# Patient Record
Sex: FEMALE | Race: WHITE | Employment: STUDENT | URBAN - METROPOLITAN AREA
[De-identification: names, ages, dates, MRNs, and addresses within clinical notes are randomized per-mention and may not be internally consistent; named-entity substitution may affect disease eponyms.]

---
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
## 2019-04-05 LAB
ALBUMIN SERPL-MCNC: NORMAL G/DL
ALP BLD-CCNC: NORMAL U/L
ALT SERPL-CCNC: NORMAL U/L
ANION GAP SERPL CALCULATED.3IONS-SCNC: NORMAL MMOL/L
AST SERPL-CCNC: NORMAL U/L
BILIRUB SERPL-MCNC: NORMAL MG/DL (ref 0.1–1.4)
BUN BLDV-MCNC: NORMAL MG/DL
CALCIUM SERPL-MCNC: NORMAL MG/DL
CHLORIDE BLD-SCNC: NORMAL MMOL/L
CO2: NORMAL MMOL/L
CREAT SERPL-MCNC: NORMAL MG/DL
GFR CALCULATED: NORMAL
GLUCOSE BLD-MCNC: NORMAL MG/DL
POTASSIUM SERPL-SCNC: NORMAL MMOL/L
SODIUM BLD-SCNC: NORMAL MMOL/L
TOTAL PROTEIN: NORMAL

## 2019-05-22 ENCOUNTER — OFFICE VISIT (OUTPATIENT)
Dept: FAMILY MEDICINE CLINIC | Age: 24
End: 2019-05-22
Payer: COMMERCIAL

## 2019-05-22 VITALS
OXYGEN SATURATION: 98 % | SYSTOLIC BLOOD PRESSURE: 120 MMHG | DIASTOLIC BLOOD PRESSURE: 62 MMHG | HEART RATE: 101 BPM | TEMPERATURE: 99.1 F | WEIGHT: 177 LBS

## 2019-05-22 DIAGNOSIS — H66.002 ACUTE SUPPURATIVE OTITIS MEDIA OF LEFT EAR WITHOUT SPONTANEOUS RUPTURE OF TYMPANIC MEMBRANE, RECURRENCE NOT SPECIFIED: ICD-10-CM

## 2019-05-22 DIAGNOSIS — J32.9 SINUSITIS, UNSPECIFIED CHRONICITY, UNSPECIFIED LOCATION: Primary | ICD-10-CM

## 2019-05-22 PROCEDURE — 99214 OFFICE O/P EST MOD 30 MIN: CPT | Performed by: FAMILY MEDICINE

## 2019-05-22 RX ORDER — AZITHROMYCIN 250 MG/1
TABLET, FILM COATED ORAL
Qty: 6 TABLET | Refills: 0 | Status: SHIPPED | OUTPATIENT
Start: 2019-05-22 | End: 2019-09-04 | Stop reason: ALTCHOICE

## 2019-05-22 RX ORDER — NORGESTIMATE AND ETHINYL ESTRADIOL 0.25-0.035
KIT ORAL
COMMUNITY

## 2019-05-22 RX ORDER — ESCITALOPRAM OXALATE 5 MG/1
TABLET ORAL
Refills: 1 | COMMUNITY
Start: 2019-05-15 | End: 2019-05-24 | Stop reason: SDUPTHER

## 2019-05-22 NOTE — PROGRESS NOTES
19  Mare Ham : 1995 Sex: female  Age: 22 y.o. Chief Complaint   Patient presents with    Congestion    Pharyngitis    Cough     lots of drainage        HPI  HPI:    Patient percent today with sinus pressure thick rhinorrhea postnasal drainage left earache no headache dizziness fever or chills. Mild sore throat without swelling or difficulty swallowing. No trismus. Dry cough nonproductive no shortness of breath wheezing or bowel symptoms. She denies any chance of being or getting pregnant and not breast-feeding. ROS:  As above      Current Outpatient Medications:     azithromycin (ZITHROMAX) 250 MG tablet, 2 po qd  day 1 then 1 po qd  days 2 - 5, Disp: 6 tablet, Rfl: 0    norgestimate-ethinyl estradiol (ORTHO-CYCLEN) 0.25-35 MG-MCG per tablet, Take by mouth, Disp: , Rfl:     escitalopram (LEXAPRO) 5 MG tablet, TAKE ONE TABLET BY MOUTH WITH EVENING MEAL, Disp: , Rfl: 1  Allergies   Allergen Reactions    Ceclor [Cefaclor] Swelling       No past medical history on file. History reviewed. No pertinent surgical history. History reviewed. No pertinent family history. Social History     Tobacco Use    Smoking status: Never Smoker    Smokeless tobacco: Never Used   Substance Use Topics    Alcohol use: Not on file    Drug use: Not on file      Social History     Social History Narrative    Not on file        Vitals:    19 1626   BP: 120/62   Pulse: 101   Temp: 99.1 °F (37.3 °C)   TempSrc: Tympanic   SpO2: 98%   Weight: 177 lb (80.3 kg)     Wt Readings from Last 3 Encounters:   19 177 lb (80.3 kg)        Physical Exam    Exam:  Const: Appears comfortable. No signs of acute distress present. Head/Face: Atraumatic, normocephalic on inspection. Eyes: No discharge from the eyes. Sclerae clear. ENMT:   Ears fluid B/L, left tm erythema, nose boggy, oral pharynx thick postnasal drainage no exudate swelling or asymmetry. Neck: Supple. Palpation reveals no adenopathy.  No masses advised to check with insurance company to ensure coverage and to fully understand benefits and cost prior to any testing. This note was created with the assistance of voice recognition software. Document was reviewed however may contain grammatical errors.

## 2019-05-24 ENCOUNTER — OFFICE VISIT (OUTPATIENT)
Dept: PRIMARY CARE CLINIC | Age: 24
End: 2019-05-24
Payer: COMMERCIAL

## 2019-05-24 VITALS
HEIGHT: 67 IN | SYSTOLIC BLOOD PRESSURE: 110 MMHG | TEMPERATURE: 98.3 F | DIASTOLIC BLOOD PRESSURE: 80 MMHG | BODY MASS INDEX: 27.47 KG/M2 | WEIGHT: 175 LBS

## 2019-05-24 DIAGNOSIS — F34.1 DYSTHYMIA: Primary | ICD-10-CM

## 2019-05-24 DIAGNOSIS — R53.83 FATIGUE, UNSPECIFIED TYPE: ICD-10-CM

## 2019-05-24 DIAGNOSIS — J01.10 ACUTE FRONTAL SINUSITIS, RECURRENCE NOT SPECIFIED: ICD-10-CM

## 2019-05-24 PROCEDURE — 99214 OFFICE O/P EST MOD 30 MIN: CPT | Performed by: FAMILY MEDICINE

## 2019-05-24 RX ORDER — ESCITALOPRAM OXALATE 5 MG/1
TABLET ORAL
Qty: 30 TABLET | Refills: 2 | Status: SHIPPED | OUTPATIENT
Start: 2019-05-24 | End: 2019-09-04 | Stop reason: SDUPTHER

## 2019-05-24 RX ORDER — PREDNISONE 1 MG/1
TABLET ORAL
Qty: 30 TABLET | Refills: 0 | Status: SHIPPED | OUTPATIENT
Start: 2019-05-24 | End: 2019-09-04 | Stop reason: ALTCHOICE

## 2019-05-24 ASSESSMENT — ENCOUNTER SYMPTOMS
RESPIRATORY NEGATIVE: 1
GASTROINTESTINAL NEGATIVE: 1
ALLERGIC/IMMUNOLOGIC NEGATIVE: 1
EYES NEGATIVE: 1

## 2019-05-24 NOTE — PROGRESS NOTES
specified            No follow-ups on file. Nicole Gaston, DO    Note was generated with the assistance of voice recognition software. Document was reviewed however may contain grammatical errors.

## 2019-09-04 ENCOUNTER — OFFICE VISIT (OUTPATIENT)
Dept: PRIMARY CARE CLINIC | Age: 24
End: 2019-09-04
Payer: COMMERCIAL

## 2019-09-04 VITALS
OXYGEN SATURATION: 96 % | SYSTOLIC BLOOD PRESSURE: 120 MMHG | WEIGHT: 176 LBS | DIASTOLIC BLOOD PRESSURE: 70 MMHG | TEMPERATURE: 98.3 F | BODY MASS INDEX: 27.98 KG/M2 | HEART RATE: 104 BPM

## 2019-09-04 DIAGNOSIS — J01.00 ACUTE NON-RECURRENT MAXILLARY SINUSITIS: Primary | ICD-10-CM

## 2019-09-04 DIAGNOSIS — F41.9 ANXIETY: ICD-10-CM

## 2019-09-04 PROCEDURE — 99214 OFFICE O/P EST MOD 30 MIN: CPT | Performed by: FAMILY MEDICINE

## 2019-09-04 RX ORDER — AZITHROMYCIN 250 MG/1
TABLET, FILM COATED ORAL
Qty: 1 PACKET | Refills: 0 | Status: SHIPPED
Start: 2019-09-04 | End: 2020-02-12 | Stop reason: ALTCHOICE

## 2019-09-04 RX ORDER — ESCITALOPRAM OXALATE 5 MG/1
TABLET ORAL
Qty: 90 TABLET | Refills: 1 | Status: SHIPPED
Start: 2019-09-04 | End: 2020-02-12 | Stop reason: SDUPTHER

## 2019-09-04 RX ORDER — PREDNISONE 10 MG/1
10 TABLET ORAL 2 TIMES DAILY
Qty: 10 TABLET | Refills: 0 | Status: SHIPPED | OUTPATIENT
Start: 2019-09-04 | End: 2019-09-09

## 2019-09-04 ASSESSMENT — ENCOUNTER SYMPTOMS
EYES NEGATIVE: 1
SINUS PAIN: 1
GASTROINTESTINAL NEGATIVE: 1
SORE THROAT: 1
RESPIRATORY NEGATIVE: 1
ALLERGIC/IMMUNOLOGIC NEGATIVE: 1

## 2019-09-04 NOTE — PROGRESS NOTES
19     Ward Yang    : 1995 Sex: female   Age: 22 y.o. Chief Complaint   Patient presents with    Anxiety    Sinus Problem     x 1 week. Using mucinex & nyquil       Prior to Admission medications    Medication Sig Start Date End Date Taking? Authorizing Provider   escitalopram (LEXAPRO) 5 MG tablet TAKE ONE TABLET BY MOUTH WITH EVENING MEAL 19  Yes Aydee Cespedes, DO   predniSONE (DELTASONE) 10 MG tablet Take 1 tablet by mouth 2 times daily for 5 days 19 Yes Aydee Cespedes, DO   azithromycin (ZITHROMAX Z-BLACK) 250 MG tablet 2 today  Then 1 qd  4 days 19  Yes Ed Fischer, DO   norgestimate-ethinyl estradiol (ORTHO-CYCLEN) 0.25-35 MG-MCG per tablet Take by mouth   Yes Historical Provider, MD          HPI: Patient is seen today medical follow-up on cough congestion sinus complaints. Onset over the past week. Denies significant fever chills. Tenderness frontal maxillary sinuses. Yellow mucus drainage. Anxiety currently stable. Maintain on Lexapro as prescribed. Review of Systems   Constitutional: Negative. HENT: Positive for congestion, sinus pain and sore throat. Eyes: Negative. Respiratory: Negative. Gastrointestinal: Negative. Endocrine: Negative. Genitourinary: Negative. Musculoskeletal: Negative. Skin: Negative. Allergic/Immunologic: Negative. Neurological: Negative. Hematological: Negative. Psychiatric/Behavioral: Negative.                Current Outpatient Medications:     escitalopram (LEXAPRO) 5 MG tablet, TAKE ONE TABLET BY MOUTH WITH EVENING MEAL, Disp: 90 tablet, Rfl: 1    predniSONE (DELTASONE) 10 MG tablet, Take 1 tablet by mouth 2 times daily for 5 days, Disp: 10 tablet, Rfl: 0    azithromycin (ZITHROMAX Z-BLACK) 250 MG tablet, 2 today  Then 1 qd  4 days, Disp: 1 packet, Rfl: 0    norgestimate-ethinyl estradiol (ORTHO-CYCLEN) 0.25-35 MG-MCG per tablet, Take by mouth, Disp: , Rfl:     Allergies

## 2019-11-05 SDOH — HEALTH STABILITY: MENTAL HEALTH: HOW OFTEN DO YOU HAVE A DRINK CONTAINING ALCOHOL?: NEVER

## 2020-02-05 ENCOUNTER — TELEPHONE (OUTPATIENT)
Dept: PEDIATRICS CLINIC | Age: 25
End: 2020-02-05

## 2020-02-05 NOTE — TELEPHONE ENCOUNTER
Mom tested positive for influenza at her doctors. Asking you could send over prophylactic tamiflu since she was exposed. Dr. Chino Carty is going to send over tamiflu for the younger siblings.

## 2020-02-07 RX ORDER — OSELTAMIVIR PHOSPHATE 75 MG/1
75 CAPSULE ORAL 2 TIMES DAILY
COMMUNITY
End: 2020-09-21

## 2020-02-12 ENCOUNTER — HOSPITAL ENCOUNTER (OUTPATIENT)
Age: 25
Discharge: HOME | End: 2020-02-14
Payer: COMMERCIAL

## 2020-02-12 ENCOUNTER — OFFICE VISIT (OUTPATIENT)
Dept: PRIMARY CARE CLINIC | Age: 25
End: 2020-02-12
Payer: COMMERCIAL

## 2020-02-12 VITALS
HEART RATE: 102 BPM | RESPIRATION RATE: 16 BRPM | SYSTOLIC BLOOD PRESSURE: 122 MMHG | TEMPERATURE: 98.4 F | BODY MASS INDEX: 28.3 KG/M2 | WEIGHT: 178 LBS | DIASTOLIC BLOOD PRESSURE: 74 MMHG | OXYGEN SATURATION: 98 %

## 2020-02-12 PROBLEM — R53.83 FATIGUE: Status: ACTIVE | Noted: 2020-02-12

## 2020-02-12 LAB
ALBUMIN SERPL-MCNC: 4.3 G/DL (ref 3.5–5.2)
ALP BLD-CCNC: 75 U/L (ref 35–104)
ALT SERPL-CCNC: 33 U/L (ref 0–32)
ANION GAP SERPL CALCULATED.3IONS-SCNC: 12 MMOL/L (ref 7–16)
AST SERPL-CCNC: 25 U/L (ref 0–31)
BASOPHILS ABSOLUTE: 0.08 E9/L (ref 0–0.2)
BASOPHILS RELATIVE PERCENT: 0.8 % (ref 0–2)
BILIRUB SERPL-MCNC: 0.4 MG/DL (ref 0–1.2)
BUN BLDV-MCNC: 11 MG/DL (ref 6–20)
CALCIUM SERPL-MCNC: 9.4 MG/DL (ref 8.6–10.2)
CHLORIDE BLD-SCNC: 101 MMOL/L (ref 98–107)
CO2: 24 MMOL/L (ref 22–29)
CREAT SERPL-MCNC: 0.8 MG/DL (ref 0.5–1)
EOSINOPHILS ABSOLUTE: 0.02 E9/L (ref 0.05–0.5)
EOSINOPHILS RELATIVE PERCENT: 0.2 % (ref 0–6)
GFR AFRICAN AMERICAN: >60
GFR NON-AFRICAN AMERICAN: >60 ML/MIN/1.73
GLUCOSE BLD-MCNC: 110 MG/DL (ref 74–99)
HCT VFR BLD CALC: 42.2 % (ref 34–48)
HEMOGLOBIN: 13.5 G/DL (ref 11.5–15.5)
IMMATURE GRANULOCYTES #: 0.04 E9/L
IMMATURE GRANULOCYTES %: 0.4 % (ref 0–5)
LYMPHOCYTES ABSOLUTE: 3.2 E9/L (ref 1.5–4)
LYMPHOCYTES RELATIVE PERCENT: 30.6 % (ref 20–42)
MCH RBC QN AUTO: 28.1 PG (ref 26–35)
MCHC RBC AUTO-ENTMCNC: 32 % (ref 32–34.5)
MCV RBC AUTO: 87.9 FL (ref 80–99.9)
MONOCYTES ABSOLUTE: 0.65 E9/L (ref 0.1–0.95)
MONOCYTES RELATIVE PERCENT: 6.2 % (ref 2–12)
NEUTROPHILS ABSOLUTE: 6.48 E9/L (ref 1.8–7.3)
NEUTROPHILS RELATIVE PERCENT: 61.8 % (ref 43–80)
PDW BLD-RTO: 12.1 FL (ref 11.5–15)
PLATELET # BLD: 304 E9/L (ref 130–450)
PMV BLD AUTO: 10.9 FL (ref 7–12)
POTASSIUM SERPL-SCNC: 4 MMOL/L (ref 3.5–5)
RBC # BLD: 4.8 E12/L (ref 3.5–5.5)
SODIUM BLD-SCNC: 137 MMOL/L (ref 132–146)
T4 TOTAL: 7.6 MCG/DL (ref 4.5–11.7)
TOTAL PROTEIN: 7.2 G/DL (ref 6.4–8.3)
TSH SERPL DL<=0.05 MIU/L-ACNC: 2.01 UIU/ML (ref 0.27–4.2)
WBC # BLD: 10.5 E9/L (ref 4.5–11.5)

## 2020-02-12 PROCEDURE — 80053 COMPREHEN METABOLIC PANEL: CPT

## 2020-02-12 PROCEDURE — 84443 ASSAY THYROID STIM HORMONE: CPT

## 2020-02-12 PROCEDURE — 84436 ASSAY OF TOTAL THYROXINE: CPT

## 2020-02-12 PROCEDURE — 85025 COMPLETE CBC W/AUTO DIFF WBC: CPT

## 2020-02-12 PROCEDURE — 99214 OFFICE O/P EST MOD 30 MIN: CPT | Performed by: FAMILY MEDICINE

## 2020-02-12 PROCEDURE — 36415 COLL VENOUS BLD VENIPUNCTURE: CPT

## 2020-02-12 RX ORDER — ESCITALOPRAM OXALATE 5 MG/1
TABLET ORAL
Qty: 90 TABLET | Refills: 3 | Status: SHIPPED
Start: 2020-02-12 | End: 2020-02-12

## 2020-02-12 RX ORDER — ESCITALOPRAM OXALATE 10 MG/1
10 TABLET ORAL DAILY
Qty: 90 TABLET | Refills: 1 | Status: SHIPPED | OUTPATIENT
Start: 2020-02-12

## 2020-02-12 ASSESSMENT — PATIENT HEALTH QUESTIONNAIRE - PHQ9
2. FEELING DOWN, DEPRESSED OR HOPELESS: 2
8. MOVING OR SPEAKING SO SLOWLY THAT OTHER PEOPLE COULD HAVE NOTICED. OR THE OPPOSITE, BEING SO FIGETY OR RESTLESS THAT YOU HAVE BEEN MOVING AROUND A LOT MORE THAN USUAL: 0
3. TROUBLE FALLING OR STAYING ASLEEP: 3
10. IF YOU CHECKED OFF ANY PROBLEMS, HOW DIFFICULT HAVE THESE PROBLEMS MADE IT FOR YOU TO DO YOUR WORK, TAKE CARE OF THINGS AT HOME, OR GET ALONG WITH OTHER PEOPLE: 2
SUM OF ALL RESPONSES TO PHQ QUESTIONS 1-9: 11
5. POOR APPETITE OR OVEREATING: 0
SUM OF ALL RESPONSES TO PHQ QUESTIONS 1-9: 11
7. TROUBLE CONCENTRATING ON THINGS, SUCH AS READING THE NEWSPAPER OR WATCHING TELEVISION: 1
4. FEELING TIRED OR HAVING LITTLE ENERGY: 2
9. THOUGHTS THAT YOU WOULD BE BETTER OFF DEAD, OR OF HURTING YOURSELF: 0
1. LITTLE INTEREST OR PLEASURE IN DOING THINGS: 2
6. FEELING BAD ABOUT YOURSELF - OR THAT YOU ARE A FAILURE OR HAVE LET YOURSELF OR YOUR FAMILY DOWN: 1
SUM OF ALL RESPONSES TO PHQ9 QUESTIONS 1 & 2: 4

## 2020-02-12 ASSESSMENT — ENCOUNTER SYMPTOMS
ALLERGIC/IMMUNOLOGIC NEGATIVE: 1
GASTROINTESTINAL NEGATIVE: 1
EYES NEGATIVE: 1
RESPIRATORY NEGATIVE: 1

## 2020-02-12 NOTE — PROGRESS NOTES
20     Clare Waller    : 1995 Sex: female   Age: 22 y.o. Chief Complaint   Patient presents with    Fatigue       Prior to Admission medications    Medication Sig Start Date End Date Taking? Authorizing Provider   escitalopram (LEXAPRO) 10 MG tablet Take 1 tablet by mouth daily 20  Yes Natanael Cespedes DO   oseltamivir (TAMIFLU) 75 MG capsule Take 75 mg by mouth 2 times daily 1 po twice daily   Yes Historical Provider, MD   norgestimate-ethinyl estradiol (ORTHO-CYCLEN) 0.25-35 MG-MCG per tablet Take by mouth   Yes Historical Provider, MD          HPI: Patient is seen today to follow-up on anxiety fatigue mild insomnia. Concerned that the current dose of Lexapro is not as effective is in the past.  We are going to adjust dosage to 10 mg daily. Reassessment next 4 to 6 weeks. Labs to be completed today. Review of Systems   Constitutional: Positive for fatigue. HENT: Negative. Eyes: Negative. Respiratory: Negative. Gastrointestinal: Negative. Endocrine: Negative. Genitourinary: Negative. Musculoskeletal: Negative. Skin: Negative. Allergic/Immunologic: Negative. Neurological: Negative. Hematological: Negative. Psychiatric/Behavioral: Negative. Systems review is overall stable as noted.         Current Outpatient Medications:     escitalopram (LEXAPRO) 10 MG tablet, Take 1 tablet by mouth daily, Disp: 90 tablet, Rfl: 1    oseltamivir (TAMIFLU) 75 MG capsule, Take 75 mg by mouth 2 times daily 1 po twice daily, Disp: , Rfl:     norgestimate-ethinyl estradiol (ORTHO-CYCLEN) 0.25-35 MG-MCG per tablet, Take by mouth, Disp: , Rfl:     Allergies   Allergen Reactions    Ceclor [Cefaclor] Swelling       Social History     Tobacco Use    Smoking status: Never Smoker    Smokeless tobacco: Never Used   Substance Use Topics    Alcohol use: Never     Frequency: Never    Drug use: Not on file      Past Surgical History:   Procedure Laterality Date    TONSILLECTOMY       No family history on file. No past medical history on file. Vitals:    02/12/20 1524   BP: 122/74   Pulse: 102   Resp: 16   Temp: 98.4 °F (36.9 °C)   TempSrc: Temporal   SpO2: 98%   Weight: 178 lb (80.7 kg)     BP Readings from Last 3 Encounters:   02/12/20 122/74   09/04/19 120/70   05/24/19 110/80        Physical Exam  Vitals signs and nursing note reviewed. Constitutional:       Appearance: She is well-developed. HENT:      Head: Normocephalic. Right Ear: External ear normal.      Left Ear: External ear normal.      Nose: Nose normal.   Eyes:      Conjunctiva/sclera: Conjunctivae normal.      Pupils: Pupils are equal, round, and reactive to light. Neck:      Musculoskeletal: Normal range of motion and neck supple. Cardiovascular:      Rate and Rhythm: Normal rate. Pulmonary:      Breath sounds: Normal breath sounds. Abdominal:      General: Bowel sounds are normal.      Palpations: Abdomen is soft. Musculoskeletal: Normal range of motion. Skin:     General: Skin is warm and dry. Neurological:      Mental Status: She is alert and oriented to person, place, and time. Psychiatric:         Behavior: Behavior normal.     Physical exam findings are all currently stable. I will maintain present meds and care. Adjustment with Lexapro to 10 mg and reassessment in 6 weeks. Plan Per Assessment:  Pam Carlson was seen today for fatigue. Diagnoses and all orders for this visit:    Anxiety    Fatigue, unspecified type  -     CBC Auto Differential; Future  -     Comprehensive Metabolic Panel; Future  -     T4; Future  -     TSH without Reflex; Future    Other orders  -     Discontinue: escitalopram (LEXAPRO) 5 MG tablet; TAKE ONE TABLET BY MOUTH WITH EVENING MEAL  -     escitalopram (LEXAPRO) 10 MG tablet; Take 1 tablet by mouth daily            Return in about 6 weeks (around 3/25/2020).       Nino Goodwin DO    Note was generated with the assistance of voice

## 2020-09-21 ENCOUNTER — VIRTUAL VISIT (OUTPATIENT)
Dept: PRIMARY CARE CLINIC | Age: 25
End: 2020-09-21
Payer: COMMERCIAL

## 2020-09-21 PROBLEM — R05.9 COUGH: Status: ACTIVE | Noted: 2020-09-21

## 2020-09-21 PROCEDURE — 99213 OFFICE O/P EST LOW 20 MIN: CPT | Performed by: FAMILY MEDICINE

## 2020-09-21 RX ORDER — AZITHROMYCIN 250 MG/1
TABLET, FILM COATED ORAL
Qty: 1 PACKET | Refills: 0 | Status: SHIPPED
Start: 2020-09-21 | End: 2020-11-09

## 2020-09-21 RX ORDER — PREDNISONE 1 MG/1
TABLET ORAL
Qty: 30 TABLET | Refills: 0 | Status: SHIPPED
Start: 2020-09-21 | End: 2020-11-09

## 2020-09-21 ASSESSMENT — ENCOUNTER SYMPTOMS
SINUS PRESSURE: 1
SINUS PAIN: 1
GASTROINTESTINAL NEGATIVE: 1
RESPIRATORY NEGATIVE: 1
EYES NEGATIVE: 1
ALLERGIC/IMMUNOLOGIC NEGATIVE: 1

## 2020-09-21 NOTE — PROGRESS NOTES
20     Monica Argueta    : 1995 Sex: female   Age: 26 y.o. Chief Complaint   Patient presents with    Sinus Problem     runny and stuffy nose, swollen glands. X 2-3 days    Congestion       Prior to Admission medications    Medication Sig Start Date End Date Taking? Authorizing Provider   escitalopram (LEXAPRO) 10 MG tablet Take 1 tablet by mouth daily 20  Yes Yaz Butt DO   norgestimate-ethinyl estradiol (ORTHO-CYCLEN) 0.25-35 MG-MCG per tablet Take by mouth   Yes Historical Provider, MD          HPI:           Review of Systems           Current Outpatient Medications:     escitalopram (LEXAPRO) 10 MG tablet, Take 1 tablet by mouth daily, Disp: 90 tablet, Rfl: 1    norgestimate-ethinyl estradiol (ORTHO-CYCLEN) 0.25-35 MG-MCG per tablet, Take by mouth, Disp: , Rfl:     Allergies   Allergen Reactions    Ceclor [Cefaclor] Swelling       Social History     Tobacco Use    Smoking status: Never Smoker    Smokeless tobacco: Never Used   Substance Use Topics    Alcohol use: Never     Frequency: Never    Drug use: Not on file      Past Surgical History:   Procedure Laterality Date    TONSILLECTOMY       No family history on file. No past medical history on file. There were no vitals filed for this visit. BP Readings from Last 3 Encounters:   20 122/74   19 120/70   19 110/80        Physical Exam             Plan Per Assessment:  There are no diagnoses linked to this encounter. No follow-ups on file. Yaz Butt DO    Note was generated with the assistance of voice recognition software. Document was reviewed however may contain grammatical errors.
MG-MCG per tablet, Take by mouth, Disp: , Rfl:     Allergies   Allergen Reactions    Ceclor [Cefaclor] Swelling       Social History     Tobacco Use    Smoking status: Never Smoker    Smokeless tobacco: Never Used   Substance Use Topics    Alcohol use: Never     Frequency: Never    Drug use: Not on file      Past Surgical History:   Procedure Laterality Date    TONSILLECTOMY       No family history on file. No past medical history on file. There were no vitals filed for this visit. BP Readings from Last 3 Encounters:   02/12/20 122/74   09/04/19 120/70   05/24/19 110/80        Physical ExamExpresses upper respiratory drainage cough congestion sinus drainage. Discussed treatment options and opted for Zithromax and prednisone Mucinex DM. We will follow-up if persistent symptoms. TeleMedicine Video Visit    This visit was performed as a virtual video visit using a synchronous, two-way, audio-video telehealth technology platform. Patient identification was verified at the start of the visit, including the patient's telephone number and physical location. I discussed with the patient the nature of our telehealth visits, that:     1. Due to the nature of an audio- video modality, the only components of a physical exam that could be done are the elements supported by direct observation. 2. I would evaluate the patient and recommend diagnostics and treatments based on my assessment. 3. If it was felt that the patient should be evaluated in clinic or an emergency room setting, then they would be directed there. 4. Our sessions are not being recorded and that personal health information is protected. 5. Our team would provide follow up care in person if/when the patient needs it. Patient does agree to proceed with telemedicine consultation.     Patient's location: home address in [facility]  Physician  location home address in [facility] other people involved in call      Time spent: Greater than Not billed by

## 2020-10-21 PROBLEM — R05.9 COUGH: Status: RESOLVED | Noted: 2020-09-21 | Resolved: 2020-10-21

## 2020-11-09 ENCOUNTER — OFFICE VISIT (OUTPATIENT)
Dept: FAMILY MEDICINE CLINIC | Age: 25
End: 2020-11-09
Payer: COMMERCIAL

## 2020-11-09 VITALS
BODY MASS INDEX: 28.62 KG/M2 | OXYGEN SATURATION: 98 % | SYSTOLIC BLOOD PRESSURE: 134 MMHG | DIASTOLIC BLOOD PRESSURE: 64 MMHG | HEART RATE: 113 BPM | TEMPERATURE: 99.5 F | WEIGHT: 180 LBS

## 2020-11-09 PROCEDURE — 99213 OFFICE O/P EST LOW 20 MIN: CPT | Performed by: PHYSICIAN ASSISTANT

## 2020-11-09 RX ORDER — AZITHROMYCIN 250 MG/1
250 TABLET, FILM COATED ORAL SEE ADMIN INSTRUCTIONS
Qty: 6 TABLET | Refills: 0 | Status: SHIPPED | OUTPATIENT
Start: 2020-11-09 | End: 2020-11-14

## 2020-11-09 RX ORDER — CHLORPHENIRAMINE MALEATE 4 MG/1
4 TABLET ORAL EVERY 6 HOURS PRN
Qty: 20 TABLET | Refills: 0 | Status: SHIPPED | OUTPATIENT
Start: 2020-11-09

## 2020-11-09 RX ORDER — PREDNISONE 10 MG/1
TABLET ORAL
Qty: 18 TABLET | Refills: 0 | Status: SHIPPED
Start: 2020-11-09 | End: 2021-10-14

## 2020-11-09 NOTE — PROGRESS NOTES
20  Laya Zelaya : 1995 Sex: female  Age 26 y.o. Subjective:  Chief Complaint   Patient presents with    Cough     all sx x4d     Congestion    Drainage         HPI:   Laya Zelaya , 26 y.o. female presents to express care for evaluation of cough,     HPI  CC:  \"cough, sinus. \"    Onset:  4 days    Associated symptoms:  Cough, congestion    Treatments: None, no current antibiotics, steroids, etc.    Aggravating or Alleviating factors: No real improvement    Miscellaneous: History of recurrent sinus infection. The patient does get these relatively regularly. The patient is not currently on any antibiotics. The patient states that she typically requires antibiotics and will do steroids. ROS:   Unless otherwise stated in this report the patient's positive and negative responses for review of systems for constitutional, eyes, ENT, cardiovascular, respiratory, gastrointestinal, neurological, , musculoskeletal, and integument systems and related systems to the presenting problem are either stated in the history of present illness or were not pertinent or were negative for the symptoms and/or complaints related to the presenting medical problem. Positives and pertinent negatives as per HPI. All others reviewed and are negative. PMH:   No past medical history on file. Past Surgical History:   Procedure Laterality Date    TONSILLECTOMY         No family history on file.     Medications:     Current Outpatient Medications:     predniSONE (DELTASONE) 10 MG tablet, 3 tabs once daily for 3 days, 2 tabs once daily for 3 days, 1 tab once daily for 3 days, Disp: 18 tablet, Rfl: 0    azithromycin (ZITHROMAX) 250 MG tablet, Take 1 tablet by mouth See Admin Instructions for 5 days 500mg on day 1 followed by 250mg on days 2 - 5, Disp: 6 tablet, Rfl: 0    chlorpheniramine (ALLER-CHLOR) 4 MG tablet, Take 1 tablet by mouth every 6 hours as needed for Allergies, Disp: 20 tablet, Rfl: 0   escitalopram (LEXAPRO) 10 MG tablet, Take 1 tablet by mouth daily, Disp: 90 tablet, Rfl: 1    norgestimate-ethinyl estradiol (ORTHO-CYCLEN) 0.25-35 MG-MCG per tablet, Take by mouth, Disp: , Rfl:     Allergies: Allergies   Allergen Reactions    Ceclor [Cefaclor] Swelling       Social History:     Social History     Tobacco Use    Smoking status: Never Smoker    Smokeless tobacco: Never Used   Substance Use Topics    Alcohol use: Never     Frequency: Never    Drug use: Not on file       Patient lives at home. Physical Exam:     Vitals:    11/09/20 1631   BP: 134/64   Pulse: 113   Temp: 99.5 °F (37.5 °C)   SpO2: 98%   Weight: 180 lb (81.6 kg)       Exam:  Physical Exam  Nurse's notes and vital signs reviewed. The patient is not hypoxic. ? General: Alert, no acute distress, patient resting comfortably Patient is not toxic or lethargic. Skin: Warm, intact, no pallor noted. There is no evidence of rash at this time. Head: Normocephalic, atraumatic  Eye: Normal conjunctiva  Ears, Nose, Throat: Right tympanic membrane clear, left tympanic membrane clear. No drainage or discharge noted. No pre- or post-auricular tenderness, erythema, or swelling noted. No rhinorrhea or congestion noted. Posterior oropharynx shows no erythema, tonsillar hypertrophy, or exudate. the uvula is midline. No trismus or drooling is noted. Moist mucous membranes. Neck: No anterior/posterior lymphadenopathy noted. No erythema, no masses, no fluctuance or induration noted. No meningeal signs. Cardiovascular: Regular Rate and Rhythm  Respiratory: No acute distress, no rhonchi, wheezing or crackles noted. No stridor or retractions are noted. Neurological: A&O x4, normal speech  Psychiatric: Cooperative         Testing:           Medical Decision Making:     The patient on arrival does not appear to be in any apparent distress or discomfort. Vital signs reviewed.   The patient does feel that this is consistent with her sinus

## 2021-10-14 ENCOUNTER — OFFICE VISIT (OUTPATIENT)
Dept: FAMILY MEDICINE CLINIC | Age: 26
End: 2021-10-14
Payer: COMMERCIAL

## 2021-10-14 VITALS
TEMPERATURE: 97.5 F | OXYGEN SATURATION: 98 % | HEART RATE: 80 BPM | WEIGHT: 196 LBS | DIASTOLIC BLOOD PRESSURE: 64 MMHG | SYSTOLIC BLOOD PRESSURE: 132 MMHG | BODY MASS INDEX: 31.16 KG/M2

## 2021-10-14 DIAGNOSIS — J01.90 ACUTE NON-RECURRENT SINUSITIS, UNSPECIFIED LOCATION: Primary | ICD-10-CM

## 2021-10-14 PROCEDURE — 99213 OFFICE O/P EST LOW 20 MIN: CPT | Performed by: PHYSICIAN ASSISTANT

## 2021-10-14 RX ORDER — PREDNISONE 10 MG/1
TABLET ORAL
Qty: 18 TABLET | Refills: 0 | Status: SHIPPED | OUTPATIENT
Start: 2021-10-14

## 2021-10-14 RX ORDER — AZITHROMYCIN 250 MG/1
250 TABLET, FILM COATED ORAL SEE ADMIN INSTRUCTIONS
Qty: 6 TABLET | Refills: 0 | Status: SHIPPED | OUTPATIENT
Start: 2021-10-14 | End: 2021-10-19

## 2021-10-14 NOTE — PROGRESS NOTES
10/14/21  Khurram Johnston : 1995 Sex: female  Age 23 y.o. Subjective:  Chief Complaint   Patient presents with    Congestion     sx x2d     Drainage         HPI:   Khurram Johnston , 23 y.o. female presents to [facility] care for evaluation of congestion, drainage, sinus infection    HPI  21-year-old female presents to [facility] for evaluation of congestion, drainage. The patient has a history of sinus infections in the spring and fall. This seems to be rather consistent with her sinus infection. The patient is not having fever, chills. No loss of smell, taste. The patient has been using Advil Cold and Sinus. The patient is using DayQuil. The patient is not any fevers. The patient is vaccinated. She is a schoolteacher. ROS:   Unless otherwise stated in this report the patient's positive and negative responses for review of systems for constitutional, eyes, ENT, cardiovascular, respiratory, gastrointestinal, neurological, , musculoskeletal, and integument systems and related systems to the presenting problem are either stated in the history of present illness or were not pertinent or were negative for the symptoms and/or complaints related to the presenting medical problem. Positives and pertinent negatives as per HPI. All others reviewed and are negative. PMH:   No past medical history on file. Past Surgical History:   Procedure Laterality Date    TONSILLECTOMY         No family history on file.     Medications:     Current Outpatient Medications:     azithromycin (ZITHROMAX) 250 MG tablet, Take 1 tablet by mouth See Admin Instructions for 5 days 500mg on day 1 followed by 250mg on days 2 - 5, Disp: 6 tablet, Rfl: 0    predniSONE (DELTASONE) 10 MG tablet, 3 tabs once daily for 3 days, 2 tabs once daily for 3 days, 1 tab once daily for 3 days, Disp: 18 tablet, Rfl: 0    chlorpheniramine (ALLER-CHLOR) 4 MG tablet, Take 1 tablet by mouth every 6 hours as needed for Allergies, Disp: 20 tablet, Rfl: 0    escitalopram (LEXAPRO) 10 MG tablet, Take 1 tablet by mouth daily, Disp: 90 tablet, Rfl: 1    norgestimate-ethinyl estradiol (ORTHO-CYCLEN) 0.25-35 MG-MCG per tablet, Take by mouth, Disp: , Rfl:     Allergies: Allergies   Allergen Reactions    Ceclor [Cefaclor] Swelling       Social History:     Social History     Tobacco Use    Smoking status: Never Smoker    Smokeless tobacco: Never Used   Substance Use Topics    Alcohol use: Never    Drug use: Not on file       Patient lives at home. Physical Exam:     Vitals:    10/14/21 1555   BP: 132/64   Pulse: 80   Temp: 97.5 °F (36.4 °C)   SpO2: 98%   Weight: 196 lb (88.9 kg)       Exam:  Physical Exam  Nurse's notes and vital signs reviewed. The patient is not hypoxic. ? General: Alert, no acute distress, patient resting comfortably Patient is not toxic or lethargic. Skin: Warm, intact, no pallor noted. There is no evidence of rash at this time. Head: Normocephalic, atraumatic  Eye: Normal conjunctiva  Ears, Nose, Throat: Right tympanic membrane clear, left tympanic membrane clear. No drainage or discharge noted. No pre- or post-auricular tenderness, erythema, or swelling noted. Nasal congestion, rhinorrhea, no epistaxis  Posterior oropharynx shows erythema and cobblestoning but no evidence of tonsillar hypertrophy, or exudate. the uvula is midline. No trismus or drooling is noted. Moist mucous membranes. Cardiovascular: Regular Rate and Rhythm  Respiratory: No acute distress, no rhonchi, wheezing or crackles noted. No stridor or retractions are noted. Neurological: A&O x4, normal speech  Psychiatric: Cooperative           Testing:           Medical Decision Making:     Vital signs reviewed    Past medical history reviewed. Allergies reviewed. Medications reviewed. Patient on arrival does not appear to be in any apparent distress or discomfort. The patient has been seen and evaluated.   The patient does not appear to be toxic or lethargic. The patient will be treated with a azithromycin and prednisone. Patient declined Covid testing at this time. The patient was educated on the proper dosage of motrin and tylenol and the appropriate intervals of each. The patient is to increase fluid intake over the next several days. The patient is to use OTC decongestant as needed. The patient is to return to express care or go directly to the emergency department should any of the signs or symptoms worsen. The patient is to followup with primary care physician in 2-3 days for repeat evaluation. The patient has no other questions or concerns at this time the patient will be discharged home. Clinical Impression:   Rodrick Kim was seen today for congestion and drainage. Diagnoses and all orders for this visit:    Acute non-recurrent sinusitis, unspecified location  -     azithromycin (ZITHROMAX) 250 MG tablet; Take 1 tablet by mouth See Admin Instructions for 5 days 500mg on day 1 followed by 250mg on days 2 - 5    Other orders  -     predniSONE (DELTASONE) 10 MG tablet; 3 tabs once daily for 3 days, 2 tabs once daily for 3 days, 1 tab once daily for 3 days        The patient is to call for any concerns or return if any of the signs or symptoms worsen. The patient is to follow-up with PCP in the next 2-3 days for repeat evaluation repeat assessment or go directly to the emergency department.      SIGNATURE: Teresa Guerrero III, PA-C

## 2022-12-27 ENCOUNTER — OFFICE VISIT (OUTPATIENT)
Dept: FAMILY MEDICINE CLINIC | Age: 27
End: 2022-12-27
Payer: COMMERCIAL

## 2022-12-27 VITALS
OXYGEN SATURATION: 99 % | SYSTOLIC BLOOD PRESSURE: 124 MMHG | WEIGHT: 200.4 LBS | HEIGHT: 67 IN | BODY MASS INDEX: 31.45 KG/M2 | TEMPERATURE: 98.5 F | HEART RATE: 109 BPM | DIASTOLIC BLOOD PRESSURE: 74 MMHG

## 2022-12-27 DIAGNOSIS — R05.9 COUGH, UNSPECIFIED TYPE: Primary | ICD-10-CM

## 2022-12-27 LAB
Lab: NORMAL
PERFORMING INSTRUMENT: NORMAL
QC PASS/FAIL: NORMAL
SARS-COV-2, POC: NORMAL

## 2022-12-27 PROCEDURE — 87426 SARSCOV CORONAVIRUS AG IA: CPT | Performed by: FAMILY MEDICINE

## 2022-12-27 PROCEDURE — 99213 OFFICE O/P EST LOW 20 MIN: CPT | Performed by: FAMILY MEDICINE

## 2022-12-27 RX ORDER — PREDNISONE 1 MG/1
TABLET ORAL
Qty: 30 TABLET | Refills: 0 | Status: SHIPPED | OUTPATIENT
Start: 2022-12-27

## 2022-12-27 RX ORDER — AZITHROMYCIN 250 MG/1
TABLET, FILM COATED ORAL
Qty: 1 PACKET | Refills: 0 | Status: SHIPPED | OUTPATIENT
Start: 2022-12-27

## 2022-12-27 ASSESSMENT — PATIENT HEALTH QUESTIONNAIRE - PHQ9
SUM OF ALL RESPONSES TO PHQ QUESTIONS 1-9: 0
SUM OF ALL RESPONSES TO PHQ QUESTIONS 1-9: 0
SUM OF ALL RESPONSES TO PHQ9 QUESTIONS 1 & 2: 0
2. FEELING DOWN, DEPRESSED OR HOPELESS: 0
1. LITTLE INTEREST OR PLEASURE IN DOING THINGS: 0
SUM OF ALL RESPONSES TO PHQ QUESTIONS 1-9: 0
SUM OF ALL RESPONSES TO PHQ QUESTIONS 1-9: 0

## 2022-12-27 ASSESSMENT — ENCOUNTER SYMPTOMS
COUGH: 1
EYES NEGATIVE: 1
GASTROINTESTINAL NEGATIVE: 1
ALLERGIC/IMMUNOLOGIC NEGATIVE: 1

## 2022-12-27 NOTE — PROGRESS NOTES
22     Christy Mosquera    : 1995 Sex: female   Age: 33 y.o. Chief Complaint   Patient presents with    Cough       Prior to Admission medications    Medication Sig Start Date End Date Taking? Authorizing Provider   azithromycin (ZITHROMAX Z-BRETT) 250 MG tablet 2 today  Then 1 qd  4 days 22  Yes Svitlana Cespedes, DO   predniSONE (DELTASONE) 5 MG tablet 4 tablets daily for 3 days then 3 tablets daily for 3 days then 2 tablets daily for 3 days then 1 tablet daily for 3 days 22  Yes Nanda Lua, DO   norgestimate-ethinyl estradiol (ORTHO-CYCLEN) 0.25-35 MG-MCG per tablet Take by mouth    Historical Provider, MD          HPI: Patient seen today with some upper respiratory cough and congestion drainage. Exam findings reviewed mild sinus as well as mild bronchitis. We are going to treat with Z-Brett and prednisone and if persistent to call. She has been fully vaccinated influenza as well as COVID. Review of Systems   Constitutional: Negative. HENT:  Positive for congestion. Eyes: Negative. Respiratory:  Positive for cough. Gastrointestinal: Negative. Endocrine: Negative. Genitourinary: Negative. Musculoskeletal: Negative. Skin: Negative. Allergic/Immunologic: Negative. Neurological: Negative. Hematological: Negative. Psychiatric/Behavioral: Negative.               Current Outpatient Medications:     azithromycin (ZITHROMAX Z-BRETT) 250 MG tablet, 2 today  Then 1 qd  4 days, Disp: 1 packet, Rfl: 0    predniSONE (DELTASONE) 5 MG tablet, 4 tablets daily for 3 days then 3 tablets daily for 3 days then 2 tablets daily for 3 days then 1 tablet daily for 3 days, Disp: 30 tablet, Rfl: 0    norgestimate-ethinyl estradiol (ORTHO-CYCLEN) 0.25-35 MG-MCG per tablet, Take by mouth, Disp: , Rfl:     Allergies   Allergen Reactions    Ceclor [Cefaclor] Swelling       Social History     Tobacco Use    Smoking status: Never    Smokeless tobacco: Never   Substance Use Topics    Alcohol use: Never      Past Surgical History:   Procedure Laterality Date    TONSILLECTOMY       No family history on file. No past medical history on file. Vitals:    12/27/22 1408   BP: 124/74   Pulse: (!) 109   Temp: 98.5 °F (36.9 °C)   SpO2: 99%   Weight: 200 lb 6.4 oz (90.9 kg)   Height: 5' 6.5\" (1.689 m)     BP Readings from Last 3 Encounters:   12/27/22 124/74   10/14/21 132/64   11/09/20 134/64        Physical Exam  Vitals and nursing note reviewed. Constitutional:       Appearance: She is well-developed. HENT:      Head: Normocephalic. Right Ear: External ear normal.      Left Ear: External ear normal.      Nose: Congestion present. Eyes:      Conjunctiva/sclera: Conjunctivae normal.      Pupils: Pupils are equal, round, and reactive to light. Cardiovascular:      Rate and Rhythm: Normal rate. Pulmonary:      Breath sounds: Rhonchi present. Abdominal:      General: Bowel sounds are normal.      Palpations: Abdomen is soft. Musculoskeletal:         General: Normal range of motion. Cervical back: Normal range of motion and neck supple. Skin:     General: Skin is warm and dry. Neurological:      Mental Status: She is alert and oriented to person, place, and time. Psychiatric:         Behavior: Behavior normal.      Congestion . we will treat with Z-Black prednisone and if persistent to call. Plan Per Assessment:  Fabrice Moseley was seen today for cough. Diagnoses and all orders for this visit:    Cough, unspecified type  -     POCT COVID-19, Antigen  -     azithromycin (ZITHROMAX Z-BLACK) 250 MG tablet; 2 today  Then 1 qd  4 days    Other orders  -     predniSONE (DELTASONE) 5 MG tablet; 4 tablets daily for 3 days then 3 tablets daily for 3 days then 2 tablets daily for 3 days then 1 tablet daily for 3 days          No follow-ups on file. Rajinder Prude, DO    Note was generated with the assistance of voice recognition software.   Document was reviewed however may contain grammatical errors.

## 2023-05-26 ENCOUNTER — OFFICE VISIT (OUTPATIENT)
Dept: FAMILY MEDICINE CLINIC | Age: 28
End: 2023-05-26
Payer: COMMERCIAL

## 2023-05-26 VITALS — HEART RATE: 100 BPM | BODY MASS INDEX: 31.8 KG/M2 | WEIGHT: 200 LBS | TEMPERATURE: 97.9 F | OXYGEN SATURATION: 100 %

## 2023-05-26 DIAGNOSIS — R05.1 ACUTE COUGH: ICD-10-CM

## 2023-05-26 DIAGNOSIS — B96.89 ACUTE BACTERIAL SINUSITIS: Primary | ICD-10-CM

## 2023-05-26 DIAGNOSIS — J01.90 ACUTE BACTERIAL SINUSITIS: Primary | ICD-10-CM

## 2023-05-26 PROCEDURE — 99213 OFFICE O/P EST LOW 20 MIN: CPT | Performed by: FAMILY MEDICINE

## 2023-05-26 RX ORDER — METHYLPREDNISOLONE 4 MG/1
TABLET ORAL
Qty: 1 KIT | Refills: 0 | Status: SHIPPED | OUTPATIENT
Start: 2023-05-26

## 2023-05-26 RX ORDER — AZITHROMYCIN 250 MG/1
250 TABLET, FILM COATED ORAL SEE ADMIN INSTRUCTIONS
Qty: 6 TABLET | Refills: 0 | Status: SHIPPED | OUTPATIENT
Start: 2023-05-26 | End: 2023-05-31

## 2023-05-26 ASSESSMENT — ENCOUNTER SYMPTOMS
SINUS PRESSURE: 1
SINUS PAIN: 1
VOICE CHANGE: 1
GASTROINTESTINAL NEGATIVE: 1
RESPIRATORY NEGATIVE: 1
SORE THROAT: 1
EYES NEGATIVE: 1
TROUBLE SWALLOWING: 0

## 2023-05-26 NOTE — PROGRESS NOTES
on day 1 followed by 250mg on days 2 - 5, Disp: 6 tablet, Rfl: 0    methylPREDNISolone (MEDROL DOSEPACK) 4 MG tablet, Take by mouth., Disp: 1 kit, Rfl: 0    norgestimate-ethinyl estradiol (ORTHO-CYCLEN) 0.25-35 MG-MCG per tablet, Take by mouth, Disp: , Rfl:    Patient Active Problem List   Diagnosis    Acute non-recurrent maxillary sinusitis    Anxiety    Fatigue     No past medical history on file. Past Surgical History:   Procedure Laterality Date    TONSILLECTOMY       Social History     Socioeconomic History    Marital status: Single     Spouse name: Not on file    Number of children: Not on file    Years of education: Not on file    Highest education level: Not on file   Occupational History    Not on file   Tobacco Use    Smoking status: Never    Smokeless tobacco: Never   Substance and Sexual Activity    Alcohol use: Never    Drug use: Not on file    Sexual activity: Not on file   Other Topics Concern    Not on file   Social History Narrative    Not on file     Social Determinants of Health     Financial Resource Strain: Not on file   Food Insecurity: Not on file   Transportation Needs: Not on file   Physical Activity: Not on file   Stress: Not on file   Social Connections: Not on file   Intimate Partner Violence: Not on file   Housing Stability: Not on file     No family history on file. There are no preventive care reminders to display for this patient. There are no preventive care reminders to display for this patient. There are no preventive care reminders to display for this patient.    Health Maintenance Due   Topic    DTaP/Tdap/Td vaccine (7 - Td or Tdap)      Health Maintenance   Topic Date Due    HIV screen  Never done    Hepatitis C screen  Never done    Pap smear  Never done    DTaP/Tdap/Td vaccine (7 - Td or Tdap) 09/24/2017    COVID-19 Vaccine (4 - Booster for Pfizer series) 12/23/2021    Depression Screen  12/27/2023    Hepatitis A vaccine  Completed    Hib vaccine  Completed    Varicella